# Patient Record
Sex: MALE | Race: WHITE
[De-identification: names, ages, dates, MRNs, and addresses within clinical notes are randomized per-mention and may not be internally consistent; named-entity substitution may affect disease eponyms.]

---

## 2019-08-16 ENCOUNTER — HOSPITAL ENCOUNTER (EMERGENCY)
Dept: HOSPITAL 26 - MED | Age: 64
Discharge: HOME | End: 2019-08-16
Payer: SELF-PAY

## 2019-08-16 VITALS — HEIGHT: 72 IN | BODY MASS INDEX: 31.15 KG/M2 | WEIGHT: 230 LBS

## 2019-08-16 VITALS — SYSTOLIC BLOOD PRESSURE: 120 MMHG | DIASTOLIC BLOOD PRESSURE: 74 MMHG

## 2019-08-16 VITALS — DIASTOLIC BLOOD PRESSURE: 82 MMHG | SYSTOLIC BLOOD PRESSURE: 132 MMHG

## 2019-08-16 DIAGNOSIS — M25.562: Primary | ICD-10-CM

## 2019-08-16 PROCEDURE — 73562 X-RAY EXAM OF KNEE 3: CPT

## 2019-08-16 PROCEDURE — 96372 THER/PROPH/DIAG INJ SC/IM: CPT

## 2019-08-16 PROCEDURE — 99283 EMERGENCY DEPT VISIT LOW MDM: CPT

## 2019-08-16 NOTE — NUR
PT CAME TO ER C/O OF LEFT KNEE PAIN X 3 WEEKS. PAIN LEVEL 9/10 WHEN AMBULATING 
WITH CANE. PT HAD HX OF KNEE SURGERY IN 2004. PER PT HIS KNEE WILL SWELL UP BUT 
GO AWAY AFTER A COUPLE DAYS. PER PT KNEE HAS BEEN SWOLLEN FOR THE LAST 3 WEEKS 
AND WILL NOT GO AWAY. PT HAS BEEN TAKING MOTRIN FOR THE LAST 3 WEEKS WITHOUT 
ANY RELIEF. PT HAS APPOINTMENT WITH PCP ON 8/23/19. MED HX: TRIPLE BYPASS  
2017, DM, HTN. SAFETY MEASURES IN PLACE. ERMD AT BEDSIDE

## 2019-08-16 NOTE — NUR
Patient discharged with v/s stable. Written and verbal after care instructions 
given and explained. Patient alert, oriented and verbalized understanding of 
instructions. Ambulatory with steady gait. All questions addressed prior to 
discharge. ID band removed. Patient advised to follow up with PMD. Rx of 
TRAMADOL AND NORCO WAS given. Patient educated on indication of medication 
including possible reaction and side effects. Opportunity to ask questions 
provided and answered.